# Patient Record
(demographics unavailable — no encounter records)

---

## 2024-12-02 NOTE — REASON FOR VISIT
[Initial Visit] : an initial visit for [Radiculopathy] : radiculopathy [Spinal Stenosis] : spinal stenosis

## 2024-12-03 NOTE — HISTORY OF PRESENT ILLNESS
[Pain Location] : pain [] : left buttock to left knee [Lumbar] : lumbar region [Worsening] : worsening [6] : a current pain level of 6/10 [Constant] : ~He/She~ states the symptoms seem to be constant [Prolonged Sitting] : worsened by prolonged sitting [Walking] : worsened by walking [de-identified] : Patient is a 79-year-old female for initial consultation of low back pain that radiates from the left buttock around the lateral aspect of her left hip and into the groin and down into the thigh, also intermittently affects the left shin.  Patient denies of any trauma the worst of her symptoms that she currently is experiencing started in August 2024 has been consulting with a physiatrist. Patient's past medical history for several years of peripheral neuropathy, patient has never undergone a neurologic consultation for EMG studies. Patient comes to us with an MRI of the lumbar spine with images and the report. [NSAIDs] : not relieved by nonsteroidal anti-inflammatory drugs

## 2024-12-03 NOTE — DISCUSSION/SUMMARY
[Medication Risks Reviewed] : Medication risks reviewed [Surgical risks reviewed] : Surgical risks reviewed [PRN] : PRN [de-identified] : Recommendation to consult with pain management for an JOSE at the level of L3-4, a referral provided. Then to follow with physical therapy.  I discussed the underlying pathophysiology of the patient's condition. I expressed to the patient that based on her previous studies of the MRI of the lumbar spine.  I advised that the patient should continue to attend physical therapy, or even just to walk, she may find some alleviation to her symptoms. Activity modifications were reviewed with the patient at length. If the patient begins to experience any changes or severe exacerbation of his symptoms, she should reach out to me as soon as possible. The patient would like to defer surgical intervention at this time.  I, Dr. Mason Shaw, personally performed the evaluation and management (E/M) services for this new patient.  That E/M includes conducting the initial examination, assessing all conditions, and establishing a plan of care.  Today, my ACP, Grecia Mcleod, was here to observe my evaluation and management services for this patient to be followed going forward.

## 2024-12-03 NOTE — CONSULT LETTER
[Dear  ___] : Dear  [unfilled], [Consult Letter:] : I had the pleasure of evaluating your patient, [unfilled]. [Please see my note below.] : Please see my note below. [Consult Closing:] : Thank you very much for allowing me to participate in the care of this patient.  If you have any questions, please do not hesitate to contact me. [Sincerely,] : Sincerely, [FreeTextEntry3] : Mason Shaw MD, FAAOS

## 2024-12-03 NOTE — PHYSICAL EXAM
[Cane] : ambulates with cane [] : Motor: [Motor Strength Lower Extremities] : left (weak) [Normal] : No costovertebral angle tenderness and no spinal tenderness [Antalgic] : antalgic [Wide-Based] : wide-based [Normal RLE] : Right Lower Extremity: No scars, rashes, lesions, ulcers, skin intact [Normal LLE] : Left Lower Extremity: No scars, rashes, lesions, ulcers, skin intact [Obese] : obese [de-identified] : Negative FABERs of bilateral hips. Negative palpable tenderness of the greater trochanteric lateral hips. No noted atrophy noted of bilateral LE musculature. [de-identified] : Heel and toe walk is intact. Tension signs of bilateral LEs are negative. 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 Retrolisthesis L2 on 3 Degenerative changes as above. No obvious fracture, no bony tumor, no infection. The conus is normal and signal.

## 2024-12-03 NOTE — DISCUSSION/SUMMARY
[Medication Risks Reviewed] : Medication risks reviewed [Surgical risks reviewed] : Surgical risks reviewed [PRN] : PRN [de-identified] : Recommendation to consult with pain management for an JOSE at the level of L3-4, a referral provided. Then to follow with physical therapy.  I discussed the underlying pathophysiology of the patient's condition. I expressed to the patient that based on her previous studies of the MRI of the lumbar spine.  I advised that the patient should continue to attend physical therapy, or even just to walk, she may find some alleviation to her symptoms. Activity modifications were reviewed with the patient at length. If the patient begins to experience any changes or severe exacerbation of his symptoms, she should reach out to me as soon as possible. The patient would like to defer surgical intervention at this time.  I, Dr. Mason Shaw, personally performed the evaluation and management (E/M) services for this new patient.  That E/M includes conducting the initial examination, assessing all conditions, and establishing a plan of care.  Today, my ACP, Grecia Mcleod, was here to observe my evaluation and management services for this patient to be followed going forward.

## 2024-12-03 NOTE — HISTORY OF PRESENT ILLNESS
[Pain Location] : pain [] : left buttock to left knee [Lumbar] : lumbar region [Worsening] : worsening [6] : a current pain level of 6/10 [Constant] : ~He/She~ states the symptoms seem to be constant [Prolonged Sitting] : worsened by prolonged sitting [Walking] : worsened by walking [de-identified] : Patient is a 79-year-old female for initial consultation of low back pain that radiates from the left buttock around the lateral aspect of her left hip and into the groin and down into the thigh, also intermittently affects the left shin.  Patient denies of any trauma the worst of her symptoms that she currently is experiencing started in August 2024 has been consulting with a physiatrist. Patient's past medical history for several years of peripheral neuropathy, patient has never undergone a neurologic consultation for EMG studies. Patient comes to us with an MRI of the lumbar spine with images and the report. [NSAIDs] : not relieved by nonsteroidal anti-inflammatory drugs

## 2024-12-03 NOTE — PHYSICAL EXAM
[Cane] : ambulates with cane [] : Motor: [Motor Strength Lower Extremities] : left (weak) [Normal] : No costovertebral angle tenderness and no spinal tenderness [Antalgic] : antalgic [Wide-Based] : wide-based [Normal RLE] : Right Lower Extremity: No scars, rashes, lesions, ulcers, skin intact [Normal LLE] : Left Lower Extremity: No scars, rashes, lesions, ulcers, skin intact [Obese] : obese [de-identified] : Negative FABERs of bilateral hips. Negative palpable tenderness of the greater trochanteric lateral hips. No noted atrophy noted of bilateral LE musculature. [de-identified] : Heel and toe walk is intact. Tension signs of bilateral LEs are negative. 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 Retrolisthesis L2 on 3 Degenerative changes as above. No obvious fracture, no bony tumor, no infection. The conus is normal and signal.